# Patient Record
Sex: FEMALE | URBAN - METROPOLITAN AREA
[De-identification: names, ages, dates, MRNs, and addresses within clinical notes are randomized per-mention and may not be internally consistent; named-entity substitution may affect disease eponyms.]

---

## 2020-09-18 ENCOUNTER — APPOINTMENT (RX ONLY)
Dept: URBAN - METROPOLITAN AREA CLINIC 133 | Facility: CLINIC | Age: 35
Setting detail: DERMATOLOGY
End: 2020-09-18

## 2020-09-18 VITALS — TEMPERATURE: 96.9 F

## 2020-09-18 DIAGNOSIS — L72.8 OTHER FOLLICULAR CYSTS OF THE SKIN AND SUBCUTANEOUS TISSUE: ICD-10-CM

## 2020-09-18 DIAGNOSIS — L81.0 POSTINFLAMMATORY HYPERPIGMENTATION: ICD-10-CM

## 2020-09-18 PROCEDURE — 11900 INJECT SKIN LESIONS </W 7: CPT

## 2020-09-18 PROCEDURE — ? OBSERVATION AND MEASURE

## 2020-09-18 PROCEDURE — ? COUNSELING

## 2020-09-18 PROCEDURE — ? OBSERVATION

## 2020-09-18 PROCEDURE — ? DEFER

## 2020-09-18 PROCEDURE — 99202 OFFICE O/P NEW SF 15 MIN: CPT | Mod: 25

## 2020-09-18 PROCEDURE — ? INTRALESIONAL KENALOG

## 2020-09-18 ASSESSMENT — LOCATION DETAILED DESCRIPTION DERM
LOCATION DETAILED: LEFT SUPRAPUBIC SKIN
LOCATION DETAILED: LEFT INGUINAL CREASE

## 2020-09-18 ASSESSMENT — LOCATION SIMPLE DESCRIPTION DERM: LOCATION SIMPLE: GROIN

## 2020-09-18 ASSESSMENT — LOCATION ZONE DERM: LOCATION ZONE: TRUNK

## 2020-09-18 NOTE — PROCEDURE: INTRALESIONAL KENALOG
Total Volume (Ccs): .5
Concentration Of Kenalog Solution Injected (Mg/Ml): 2.5
Administered By (Optional): Samara Curran, DO
Medical Necessity Clause: This procedure was medically necessary because the lesions that were treated were:
Detail Level: Detailed
Kenalog Preparation: Kenalog
Include Z78.9 (Other Specified Conditions Influencing Health Status) As An Associated Diagnosis?: No
X Size Of Lesion In Cm (Optional): 1.5
Consent: The risks of atrophy were reviewed with the patient.

## 2020-09-18 NOTE — PROCEDURE: MIPS QUALITY
Quality 265: Biopsy Follow-Up: Biopsy results reviewed, communicated, tracked, and documented
Quality 130: Documentation Of Current Medications In The Medical Record: Current Medications Documented
Detail Level: Detailed
Quality 226: Preventive Care And Screening: Tobacco Use: Screening And Cessation Intervention: Patient screened for tobacco use and is an ex/non-smoker
Quality 431: Preventive Care And Screening: Unhealthy Alcohol Use - Screening: Patient screened for unhealthy alcohol use using a single question and scores less than 2 times per year

## 2020-09-18 NOTE — PROCEDURE: COUNSELING
Detail Level: Detailed
Patient Specific Counseling (Will Not Stick From Patient To Patient): Do not manipulate or try to drain the cyst, as this creates additional scar tissue under the skin and increases chance of infection.

## 2020-09-18 NOTE — HPI: SKIN LESION
What Type Of Note Output Would You Prefer (Optional)?: Standard Output
How Severe Is Your Skin Lesion?: moderate
Is This A New Presentation, Or A Follow-Up?: Skin Lesion
Additional History: Patient reports a year ago she first noticed the spot and thought it was an ingrown hair. She went to her doctor and was given pills. It started to flare up again around March but due to COVID she waited to get it evaluated. [facility]. Has tried hot compresses. Has done de-crustation methods. Patient was given some sulfa based abx ? 1 month ago, (Patient not sure of the name of\\nmedication she took.) Finished course 2 weeks ago. Patient would like to have it drained. Patient stated lesion is\\nembarrassing. She reports draining it herself every day by squeezing and that white material comes out first followed by clear fluid.

## 2020-09-30 ENCOUNTER — APPOINTMENT (RX ONLY)
Dept: URBAN - METROPOLITAN AREA CLINIC 133 | Facility: CLINIC | Age: 35
Setting detail: DERMATOLOGY
End: 2020-09-30

## 2020-09-30 VITALS — TEMPERATURE: 97.7 F

## 2020-09-30 DIAGNOSIS — L72.0 EPIDERMAL CYST: ICD-10-CM

## 2020-09-30 PROCEDURE — ? ORDER TESTS

## 2020-09-30 PROCEDURE — 10060 I&D ABSCESS SIMPLE/SINGLE: CPT

## 2020-09-30 PROCEDURE — ? PRESCRIPTION

## 2020-09-30 PROCEDURE — ? INCISION AND DRAINAGE

## 2020-09-30 PROCEDURE — ? COUNSELING

## 2020-09-30 RX ORDER — DOXYCYCLINE HYCLATE 100 MG/1
TABLET, COATED ORAL
Qty: 20 | Refills: 0 | Status: ERX | COMMUNITY
Start: 2020-09-30

## 2020-09-30 RX ADMIN — DOXYCYCLINE HYCLATE: 100 TABLET, COATED ORAL at 00:00

## 2020-09-30 ASSESSMENT — LOCATION SIMPLE DESCRIPTION DERM: LOCATION SIMPLE: GROIN

## 2020-09-30 ASSESSMENT — LOCATION DETAILED DESCRIPTION DERM
LOCATION DETAILED: LEFT SUPRAPUBIC SKIN
LOCATION DETAILED: LEFT INGUINAL CREASE

## 2020-09-30 ASSESSMENT — LOCATION ZONE DERM: LOCATION ZONE: TRUNK

## 2020-09-30 NOTE — PROCEDURE: INCISION AND DRAINAGE
Post-Care Instructions: I reviewed with the patient in detail post-care instructions. Patient should keep wound covered and call the office should any redness, pain, swelling or worsening occur.
Lesion Type: Abscess
Drainage Amount?: moderate
Include Sutures?: No
Curette Text (Optional): After the contents were expressed a curette was used to partially remove the cyst wall.
Wound Care: Petrolatum
Preparation Text: The area was prepped in the usual clean fashion.
Size Of Lesion In Cm (Optional But May Be Required For Some Insurances): 3
Anesthesia Type: 1% lidocaine with epinephrine
Dressing: dry sterile dressing
Detail Level: Detailed
Suture Text: The incision was partially closed with
Consent was obtained and risks were reviewed including but not limited to delayed wound healing, infection, need for multiple I and D's, and pain.
Method: 11 blade
Packing?: iodoform packing strips
Body Location Override (Optional - Billing Will Still Be Based On Selected Body Map Location If Applicable): left inguinal crease
Drainage Type?: purulent  and cyst-like
Epidermal Sutures: 4-0 Ethilon
Epidermal Closure: simple interrupted

## 2020-09-30 NOTE — PROCEDURE: MIPS QUALITY
Quality 431: Preventive Care And Screening: Unhealthy Alcohol Use - Screening: Patient screened for unhealthy alcohol use using a single question and scores less than 2 times per year
Quality 265: Biopsy Follow-Up: Biopsy results reviewed, communicated, tracked, and documented
Quality 226: Preventive Care And Screening: Tobacco Use: Screening And Cessation Intervention: Patient screened for tobacco use and is an ex/non-smoker
Quality 130: Documentation Of Current Medications In The Medical Record: Current Medications Documented
Detail Level: Detailed

## 2020-09-30 NOTE — PROCEDURE: COUNSELING
Detail Level: Detailed
Patient Specific Counseling (Will Not Stick From Patient To Patient): - Discussed I&D and a course of antibiotics.\\n- collected culture today, sent to [facility].\\n- start doxycycline 100mg bid for 10 days.\\n- wound check this Friday.

## 2020-10-02 ENCOUNTER — APPOINTMENT (RX ONLY)
Dept: URBAN - METROPOLITAN AREA CLINIC 133 | Facility: CLINIC | Age: 35
Setting detail: DERMATOLOGY
End: 2020-10-02

## 2020-10-02 VITALS — TEMPERATURE: 97.5 F

## 2020-10-02 DIAGNOSIS — L72.8 OTHER FOLLICULAR CYSTS OF THE SKIN AND SUBCUTANEOUS TISSUE: ICD-10-CM

## 2020-10-02 PROCEDURE — ? POST-OP WOUND CHECK

## 2020-10-02 ASSESSMENT — LOCATION SIMPLE DESCRIPTION DERM: LOCATION SIMPLE: GROIN

## 2020-10-02 ASSESSMENT — LOCATION ZONE DERM: LOCATION ZONE: TRUNK

## 2020-10-02 ASSESSMENT — LOCATION DETAILED DESCRIPTION DERM: LOCATION DETAILED: LEFT INGUINAL CREASE

## 2020-10-02 NOTE — PROCEDURE: POST-OP WOUND CHECK
Add 54792 Cpt? (Important Note: In 2017 The Use Of 36500 Is Being Tracked By Cms To Determine Future Global Period Reimbursement For Global Periods): no
Wound Evaluated By: Dr. Curran
Detail Level: Detailed
Wound Dressing Override (Optional): non stick pad

## 2020-10-05 ENCOUNTER — APPOINTMENT (RX ONLY)
Dept: URBAN - METROPOLITAN AREA CLINIC 133 | Facility: CLINIC | Age: 35
Setting detail: DERMATOLOGY
End: 2020-10-05

## 2020-10-05 DIAGNOSIS — S31000A OPEN WOUND(S) (MULTIPLE) OF UNSPECIFIED SITE(S), WITHOUT MENTION OF COMPLICATION: ICD-10-CM

## 2020-10-05 PROBLEM — S31.109A UNSPECIFIED OPEN WOUND OF ABDOMINAL WALL, UNSPECIFIED QUADRANT WITHOUT PENETRATION INTO PERITONEAL CAVITY, INITIAL ENCOUNTER: Status: ACTIVE | Noted: 2020-10-05

## 2020-10-05 PROCEDURE — ? POST-OP WOUND CHECK

## 2020-10-05 ASSESSMENT — LOCATION SIMPLE DESCRIPTION DERM: LOCATION SIMPLE: GROIN

## 2020-10-05 ASSESSMENT — LOCATION ZONE DERM: LOCATION ZONE: TRUNK

## 2020-10-05 ASSESSMENT — LOCATION DETAILED DESCRIPTION DERM: LOCATION DETAILED: LEFT INGUINAL CREASE

## 2020-10-05 NOTE — PROCEDURE: POST-OP WOUND CHECK
Detail Level: Detailed
Add 64566 Cpt? (Important Note: In 2017 The Use Of 38990 Is Being Tracked By Cms To Determine Future Global Period Reimbursement For Global Periods): no

## 2020-10-05 NOTE — PROCEDURE: MIPS QUALITY
Quality 431: Preventive Care And Screening: Unhealthy Alcohol Use - Screening: Patient screened for unhealthy alcohol use using a single question and scores less than 2 times per year
Detail Level: Detailed
Quality 226: Preventive Care And Screening: Tobacco Use: Screening And Cessation Intervention: Patient screened for tobacco use, is a smoker AND received Cessation Counseling
Quality 130: Documentation Of Current Medications In The Medical Record: Current Medications Documented
Quality 265: Biopsy Follow-Up: Biopsy results reviewed, communicated, tracked, and documented

## 2020-10-09 ENCOUNTER — APPOINTMENT (RX ONLY)
Dept: URBAN - METROPOLITAN AREA CLINIC 133 | Facility: CLINIC | Age: 35
Setting detail: DERMATOLOGY
End: 2020-10-09

## 2020-10-09 VITALS — TEMPERATURE: 97.7 F

## 2020-10-09 DIAGNOSIS — S31000A OPEN WOUND(S) (MULTIPLE) OF UNSPECIFIED SITE(S), WITHOUT MENTION OF COMPLICATION: ICD-10-CM | Status: IMPROVED

## 2020-10-09 PROBLEM — S31.109A UNSPECIFIED OPEN WOUND OF ABDOMINAL WALL, UNSPECIFIED QUADRANT WITHOUT PENETRATION INTO PERITONEAL CAVITY, INITIAL ENCOUNTER: Status: ACTIVE | Noted: 2020-10-09

## 2020-10-09 PROCEDURE — ? POST-OP WOUND CHECK

## 2020-10-09 ASSESSMENT — LOCATION ZONE DERM: LOCATION ZONE: TRUNK

## 2020-10-09 ASSESSMENT — LOCATION SIMPLE DESCRIPTION DERM: LOCATION SIMPLE: GROIN

## 2020-10-09 ASSESSMENT — LOCATION DETAILED DESCRIPTION DERM: LOCATION DETAILED: LEFT INGUINAL CREASE

## 2020-10-09 NOTE — PROCEDURE: MIPS QUALITY
Quality 431: Preventive Care And Screening: Unhealthy Alcohol Use - Screening: Patient screened for unhealthy alcohol use using a single question and scores less than 2 times per year
Quality 226: Preventive Care And Screening: Tobacco Use: Screening And Cessation Intervention: Patient screened for tobacco use, is a smoker AND received Cessation Counseling
Quality 265: Biopsy Follow-Up: Biopsy results reviewed, communicated, tracked, and documented
Quality 130: Documentation Of Current Medications In The Medical Record: Current Medications Documented
Detail Level: Detailed

## 2020-10-09 NOTE — PROCEDURE: POST-OP WOUND CHECK
Wound Evaluated By: Dr. Curran
Detail Level: Detailed
Add 42257 Cpt? (Important Note: In 2017 The Use Of 21844 Is Being Tracked By Cms To Determine Future Global Period Reimbursement For Global Periods): no
Additional Comments: Appropriately healing with small and shallow open area remaining.\\nContinue with Vaseline and keep covered for 1-2 weeks until fully healed. \\nRecheck in 6 weeks, discuss injecting kenalog vs possibility of excision.

## 2020-11-18 ENCOUNTER — APPOINTMENT (RX ONLY)
Dept: URBAN - METROPOLITAN AREA CLINIC 133 | Facility: CLINIC | Age: 35
Setting detail: DERMATOLOGY
End: 2020-11-18

## 2020-11-18 VITALS — HEIGHT: 65 IN | WEIGHT: 130 LBS | TEMPERATURE: 96.9 F

## 2020-11-18 DIAGNOSIS — L72.8 OTHER FOLLICULAR CYSTS OF THE SKIN AND SUBCUTANEOUS TISSUE: ICD-10-CM | Status: IMPROVED

## 2020-11-18 PROCEDURE — ? PHOTO-DOCUMENTATION

## 2020-11-18 PROCEDURE — ? DEFER

## 2020-11-18 PROCEDURE — ? COUNSELING

## 2020-11-18 ASSESSMENT — LOCATION DETAILED DESCRIPTION DERM: LOCATION DETAILED: LEFT LABIUM MAJUS

## 2020-11-18 ASSESSMENT — LOCATION ZONE DERM: LOCATION ZONE: VULVA

## 2020-11-18 ASSESSMENT — LOCATION SIMPLE DESCRIPTION DERM: LOCATION SIMPLE: LABIA MAJORA

## 2020-11-18 NOTE — PROCEDURE: DEFER
Detail Level: Detailed
Introduction Text (Please End With A Colon): The following procedure was deferred:
Instructions (Optional): Instructed patient to shave around the area 1-2 days before the excision. She will be provided antibiotics after the excision to decrease infection risks. Sutures will stay in for 10 days.
Procedure To Be Performed At Next Visit: Excision

## 2020-11-18 NOTE — PROCEDURE: PHOTO-DOCUMENTATION
Detail Level: Detailed
Detail Level: Zone
Size Of Lesion: 1.5cm
Photo Preface (Leave Blank If You Do Not Want): Photographs were obtained today

## 2020-11-18 NOTE — PROCEDURE: MIPS QUALITY
Detail Level: Detailed
Quality 431: Preventive Care And Screening: Unhealthy Alcohol Use - Screening: Patient screened for unhealthy alcohol use using a single question and scores less than 2 times per year
Quality 130: Documentation Of Current Medications In The Medical Record: Current Medications Documented
Quality 265: Biopsy Follow-Up: Biopsy results reviewed, communicated, tracked, and documented
Quality 226: Preventive Care And Screening: Tobacco Use: Screening And Cessation Intervention: Patient screened for tobacco use, is a smoker AND received Cessation Counseling